# Patient Record
Sex: MALE | Race: BLACK OR AFRICAN AMERICAN | NOT HISPANIC OR LATINO | URBAN - METROPOLITAN AREA
[De-identification: names, ages, dates, MRNs, and addresses within clinical notes are randomized per-mention and may not be internally consistent; named-entity substitution may affect disease eponyms.]

---

## 2021-06-28 ENCOUNTER — OFFICE VISIT (OUTPATIENT)
Dept: URBAN - METROPOLITAN AREA CLINIC 96 | Facility: CLINIC | Age: 56
End: 2021-06-28
Payer: COMMERCIAL

## 2021-06-28 ENCOUNTER — WEB ENCOUNTER (OUTPATIENT)
Dept: URBAN - METROPOLITAN AREA CLINIC 96 | Facility: CLINIC | Age: 56
End: 2021-06-28

## 2021-06-28 VITALS
DIASTOLIC BLOOD PRESSURE: 93 MMHG | WEIGHT: 258 LBS | TEMPERATURE: 96.5 F | HEART RATE: 89 BPM | SYSTOLIC BLOOD PRESSURE: 173 MMHG | BODY MASS INDEX: 38.21 KG/M2 | HEIGHT: 69 IN

## 2021-06-28 DIAGNOSIS — R10.13 DYSPEPSIA: ICD-10-CM

## 2021-06-28 DIAGNOSIS — K92.1 MELENA: ICD-10-CM

## 2021-06-28 DIAGNOSIS — K44.9 HIATAL HERNIA: ICD-10-CM

## 2021-06-28 DIAGNOSIS — R68.81 EARLY SATIETY: ICD-10-CM

## 2021-06-28 DIAGNOSIS — R53.83 FATIGUE, UNSPECIFIED TYPE: ICD-10-CM

## 2021-06-28 DIAGNOSIS — K62.5 RECTAL BLEEDING: ICD-10-CM

## 2021-06-28 DIAGNOSIS — R12 HEARTBURN: ICD-10-CM

## 2021-06-28 PROCEDURE — 99204 OFFICE O/P NEW MOD 45 MIN: CPT | Performed by: INTERNAL MEDICINE

## 2021-06-28 NOTE — HPI-TODAY'S VISIT:
Patient presents self referred. 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 Estevez 9/28/2015, normal to TI. EGD same date with hiatal hernia otherwise normal. Hx of SBO and SBFT as outpatient as ordered by Dr. Herb elkins 9/2/2015.   Patient reports last week had episode of melena. No prior such episode. Witnessed by wife. June 5-14 was having such. No easy bruising or bleeding, no unintentional weight loss. No f/c. No NSAIDs. No hx of PUD. No  hx of hepatitis. Hx of GERD.  No hx of H pylori. No NSAIDs.   No recent labs.   No prior hx of anemia.  No easy bruising or bleeding. Does report fatigue.   No dysphagia. Does report early satiety. Worsening GERD and taking Tums daily which does help.  Occasional rectal bleeding.

## 2021-07-05 LAB
ABSOLUTE NRBC COUNT: 0
HCT: 38.5
HGB: 12.1
MCH: 27.3
MCHC: 31.4
MCV: 86.7
MPV: 12.1
NRBC AUTO: 0
PERFORMING LAB: (no result)
PLATELETS: 201
RBC: 4.44
RDW: 12.9
WBC: 6.5

## 2021-08-19 ENCOUNTER — OFFICE VISIT (OUTPATIENT)
Dept: URBAN - METROPOLITAN AREA SURGERY CENTER 18 | Facility: SURGERY CENTER | Age: 56
End: 2021-08-19
Payer: COMMERCIAL

## 2021-08-19 DIAGNOSIS — K62.5 ANAL BLEEDING: ICD-10-CM

## 2021-08-19 DIAGNOSIS — K31.89 ACQUIRED DEFORMITY OF DUODENUM: ICD-10-CM

## 2021-08-19 DIAGNOSIS — K21.00 ALKALINE REFLUX ESOPHAGITIS: ICD-10-CM

## 2021-08-19 PROCEDURE — 45378 DIAGNOSTIC COLONOSCOPY: CPT | Performed by: INTERNAL MEDICINE

## 2021-08-19 PROCEDURE — 43239 EGD BIOPSY SINGLE/MULTIPLE: CPT | Performed by: INTERNAL MEDICINE

## 2021-08-19 PROCEDURE — G8907 PT DOC NO EVENTS ON DISCHARG: HCPCS | Performed by: INTERNAL MEDICINE

## 2022-04-13 ENCOUNTER — OFFICE VISIT (OUTPATIENT)
Dept: URBAN - METROPOLITAN AREA CLINIC 96 | Facility: CLINIC | Age: 57
End: 2022-04-13

## 2022-04-13 NOTE — HPI-TODAY'S VISIT:
55 yo male previously seen 6/28/2021. 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 Estevez 9/28/2015, normal to TI. EGD same date with hiatal hernia otherwise normal. Hx of SBO and SBFT as outpatient as ordered by Dr. Estevez normal 9/2/2015.   Patient reports last week had episode of melena. No prior such episode. Witnessed by wife. June 5-14 was having such. No easy bruising or bleeding, no unintentional weight loss. No f/c. No NSAIDs. No hx of PUD. No  hx of hepatitis. Hx of GERD.  No hx of H pylori. No NSAIDs. No dysphagia. Does report early satiety.   EGD and colonoscopy done 8/19/2021 with diverticulosis, internal hemorrhoids. Pathology with normal duodenal bx, gastric bx negative for H pylori, lower esophageal bx with reflux inflammation noted.   He reports

## 2022-05-24 ENCOUNTER — OFFICE VISIT (OUTPATIENT)
Dept: URBAN - METROPOLITAN AREA CLINIC 96 | Facility: CLINIC | Age: 57
End: 2022-05-24

## 2022-05-24 ENCOUNTER — OFFICE VISIT (OUTPATIENT)
Dept: URBAN - METROPOLITAN AREA CLINIC 96 | Facility: CLINIC | Age: 57
End: 2022-05-24
Payer: COMMERCIAL

## 2022-05-24 VITALS
HEART RATE: 98 BPM | DIASTOLIC BLOOD PRESSURE: 81 MMHG | TEMPERATURE: 98.6 F | HEIGHT: 69 IN | SYSTOLIC BLOOD PRESSURE: 145 MMHG | WEIGHT: 247 LBS | BODY MASS INDEX: 36.58 KG/M2

## 2022-05-24 DIAGNOSIS — K44.9 HIATAL HERNIA: ICD-10-CM

## 2022-05-24 DIAGNOSIS — R12 HEARTBURN: ICD-10-CM

## 2022-05-24 DIAGNOSIS — R10.13 DYSPEPSIA: ICD-10-CM

## 2022-05-24 DIAGNOSIS — K21.00 GASTROESOPHAGEAL REFLUX DISEASE WITH ESOPHAGITIS WITHOUT HEMORRHAGE: ICD-10-CM

## 2022-05-24 PROCEDURE — 99214 OFFICE O/P EST MOD 30 MIN: CPT | Performed by: INTERNAL MEDICINE

## 2022-05-24 RX ORDER — PANTOPRAZOLE SODIUM 40 MG/1
1 TABLET TABLET, DELAYED RELEASE ORAL ONCE A DAY
Qty: 90 TABLET | Refills: 1 | OUTPATIENT
Start: 2022-05-24

## 2022-05-24 NOTE — HPI-TODAY'S VISIT:
57 yo male previously seen 6/28/2021. 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 Estevez 9/28/2015, normal to TI. EGD same date with hiatal hernia otherwise normal. Hx of SBO and SBFT as outpatient as ordered by Dr. Estevez normal 9/2/2015.   Patient reports last week had episode of melena. No prior such episode. Witnessed by wife. June 5-14 was having such. No easy bruising or bleeding, no unintentional weight loss. No f/c. No NSAIDs. No hx of PUD. No  hx of hepatitis. Hx of GERD.  No hx of H pylori. No NSAIDs. No dysphagia. Does report early satiety.   EGD and colonoscopy done 8/19/2021 with diverticulosis, internal hemorrhoids. Pathology with normal duodenal bx, gastric bx negative for H pylori, lower esophageal bx with reflux inflammation noted.   He reports

## 2022-05-24 NOTE — HPI-TODAY'S VISIT:
57 yo male previously seen 6/28/2021. 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 Estevez 9/28/2015, normal to TI. EGD same date with hiatal hernia otherwise normal. Hx of SBO and SBFT as outpatient as ordered by Dr. Estevez normal 9/2/2015.   Patient reports last week had episode of melena. No prior such episode. Witnessed by wife. June 5-14 was having such. No easy bruising or bleeding, no unintentional weight loss. No f/c. No NSAIDs. No hx of PUD. No  hx of hepatitis. Hx of GERD.  No hx of H pylori. No NSAIDs. No dysphagia. Does report early satiety.   EGD and colonoscopy done 8/19/2021 with diverticulosis, internal hemorrhoids. Pathology with normal duodenal bx, gastric bx negative for H pylori, lower esophageal bx with reflux inflammation noted.   He reports having daily heartburn episodes, worse after eating. No n/v, no dysphagia, no odynophagia. No melena, no rectal bleeding. No unintentional weight loss. Takes Tums nightly which helps minimally.

## 2022-07-12 ENCOUNTER — OFFICE VISIT (OUTPATIENT)
Dept: URBAN - METROPOLITAN AREA TELEHEALTH 2 | Facility: TELEHEALTH | Age: 57
End: 2022-07-12

## 2022-07-12 RX ORDER — PANTOPRAZOLE SODIUM 40 MG/1
1 TABLET TABLET, DELAYED RELEASE ORAL ONCE A DAY
Qty: 90 TABLET | Refills: 1 | Status: ACTIVE | COMMUNITY
Start: 2022-05-24

## 2023-01-06 ENCOUNTER — ERX REFILL RESPONSE (OUTPATIENT)
Dept: URBAN - METROPOLITAN AREA CLINIC 96 | Facility: CLINIC | Age: 58
End: 2023-01-06

## 2023-01-06 RX ORDER — PANTOPRAZOLE SODIUM 40 MG/1
1 TABLET TABLET, DELAYED RELEASE ORAL ONCE A DAY
Qty: 90 TABLET | Refills: 1 | OUTPATIENT

## 2023-01-06 RX ORDER — PANTOPRAZOLE SODIUM 40 MG/1
TAKE 1 TABLET BY MOUTH EVERY DAY TABLET, DELAYED RELEASE ORAL
Qty: 90 TABLET | Refills: 0 | OUTPATIENT

## 2023-01-20 ENCOUNTER — OFFICE VISIT (OUTPATIENT)
Dept: URBAN - METROPOLITAN AREA TELEHEALTH 2 | Facility: TELEHEALTH | Age: 58
End: 2023-01-20
Payer: COMMERCIAL

## 2023-01-20 ENCOUNTER — LAB OUTSIDE AN ENCOUNTER (OUTPATIENT)
Dept: URBAN - METROPOLITAN AREA TELEHEALTH 2 | Facility: TELEHEALTH | Age: 58
End: 2023-01-20

## 2023-01-20 VITALS — HEIGHT: 69 IN

## 2023-01-20 DIAGNOSIS — R12 HEARTBURN: ICD-10-CM

## 2023-01-20 DIAGNOSIS — K44.9 HIATAL HERNIA: ICD-10-CM

## 2023-01-20 DIAGNOSIS — K21.00 GASTROESOPHAGEAL REFLUX DISEASE WITH ESOPHAGITIS WITHOUT HEMORRHAGE: ICD-10-CM

## 2023-01-20 PROBLEM — 266433003: Status: ACTIVE | Noted: 2022-05-24

## 2023-01-20 PROCEDURE — 99213 OFFICE O/P EST LOW 20 MIN: CPT | Performed by: INTERNAL MEDICINE

## 2023-01-20 RX ORDER — PANTOPRAZOLE SODIUM 40 MG/1
TAKE 1 TABLET BY MOUTH EVERY DAY TABLET, DELAYED RELEASE ORAL
Qty: 90 TABLET | Refills: 0 | Status: ACTIVE | COMMUNITY

## 2023-01-20 RX ORDER — PANTOPRAZOLE SODIUM 40 MG/1
1 TABLET TABLET, DELAYED RELEASE ORAL ONCE A DAY
Qty: 90 TABLET | Refills: 1 | OUTPATIENT

## 2023-01-20 NOTE — HPI-TODAY'S VISIT:
Pt states he has been having some gastro issues - when he eats he feels like the food is not digesting - gets a burning sensation - kind of like acid reflux - then has stomach pains-  has been going on for awhile He was given Pantoprazole by his PCP - it helped at first and then things started to get bad again - worse than before pain is in midepigastric area at night when he lays down he gets a gurgling noise coming from the center of his chest Last egd in 8/21 -- reflux esophagitis and small hiatal hernia no nsaid use on a regular basis only takes Colchicine as needed as well

## 2023-05-24 ENCOUNTER — ERX REFILL RESPONSE (OUTPATIENT)
Dept: URBAN - METROPOLITAN AREA CLINIC 96 | Facility: CLINIC | Age: 58
End: 2023-05-24

## 2023-05-24 RX ORDER — PANTOPRAZOLE SODIUM 40 MG/1
TAKE 1 TABLET BY MOUTH EVERY DAY TABLET, DELAYED RELEASE ORAL
Qty: 90 TABLET | Refills: 0 | OUTPATIENT

## 2023-05-26 ENCOUNTER — TELEPHONE ENCOUNTER (OUTPATIENT)
Dept: URBAN - METROPOLITAN AREA CLINIC 96 | Facility: CLINIC | Age: 58
End: 2023-05-26

## 2023-06-01 ENCOUNTER — LAB OUTSIDE AN ENCOUNTER (OUTPATIENT)
Dept: URBAN - METROPOLITAN AREA CLINIC 96 | Facility: CLINIC | Age: 58
End: 2023-06-01

## 2023-07-11 ENCOUNTER — OFFICE VISIT (OUTPATIENT)
Dept: URBAN - METROPOLITAN AREA SURGERY CENTER 15 | Facility: SURGERY CENTER | Age: 58
End: 2023-07-11

## 2023-07-28 ENCOUNTER — OFFICE VISIT (OUTPATIENT)
Dept: URBAN - METROPOLITAN AREA SURGERY CENTER 18 | Facility: SURGERY CENTER | Age: 58
End: 2023-07-28

## 2023-08-31 ENCOUNTER — OFFICE VISIT (OUTPATIENT)
Dept: URBAN - METROPOLITAN AREA SURGERY CENTER 18 | Facility: SURGERY CENTER | Age: 58
End: 2023-08-31
Payer: COMMERCIAL

## 2023-08-31 DIAGNOSIS — K22.89 OTHER SPECIFIED DISEASE OF ESOPHAGUS: ICD-10-CM

## 2023-08-31 DIAGNOSIS — K29.60 ADENOPAPILLOMATOSIS GASTRICA: ICD-10-CM

## 2023-08-31 PROCEDURE — G8907 PT DOC NO EVENTS ON DISCHARG: HCPCS | Performed by: INTERNAL MEDICINE

## 2023-08-31 PROCEDURE — 43239 EGD BIOPSY SINGLE/MULTIPLE: CPT | Performed by: INTERNAL MEDICINE

## 2024-02-05 ENCOUNTER — OV EP (OUTPATIENT)
Dept: URBAN - METROPOLITAN AREA CLINIC 96 | Facility: CLINIC | Age: 59
End: 2024-02-05
Payer: COMMERCIAL

## 2024-02-05 VITALS
TEMPERATURE: 97.2 F | HEIGHT: 69 IN | BODY MASS INDEX: 38.51 KG/M2 | WEIGHT: 260 LBS | HEART RATE: 81 BPM | DIASTOLIC BLOOD PRESSURE: 92 MMHG | SYSTOLIC BLOOD PRESSURE: 139 MMHG

## 2024-02-05 DIAGNOSIS — K92.1 MELENA: ICD-10-CM

## 2024-02-05 DIAGNOSIS — K21.00 GASTROESOPHAGEAL REFLUX DISEASE WITH ESOPHAGITIS WITHOUT HEMORRHAGE: ICD-10-CM

## 2024-02-05 DIAGNOSIS — R10.13 DYSPEPSIA: ICD-10-CM

## 2024-02-05 DIAGNOSIS — K44.9 HIATAL HERNIA: ICD-10-CM

## 2024-02-05 PROCEDURE — 99214 OFFICE O/P EST MOD 30 MIN: CPT | Performed by: INTERNAL MEDICINE

## 2024-02-05 RX ORDER — ATORVASTATIN CALCIUM 40 MG/1
TABLET ORAL
Qty: 90 TABLET | Status: ACTIVE | COMMUNITY

## 2024-02-05 RX ORDER — PANTOPRAZOLE SODIUM 40 MG/1
TAKE 1 TABLET BY MOUTH EVERY DAY TABLET, DELAYED RELEASE ORAL
Qty: 90 TABLET | Refills: 0 | Status: ACTIVE | COMMUNITY

## 2024-02-05 RX ORDER — AMLODIPINE AND VALSARTAN 5; 160 MG/1; MG/1
TABLET, FILM COATED ORAL
Qty: 90 TABLET | Status: ACTIVE | COMMUNITY

## 2024-02-05 RX ORDER — PANTOPRAZOLE SODIUM 40 MG/1
1 TABLET TABLET, DELAYED RELEASE ORAL ONCE A DAY
Qty: 90 TABLET | Refills: 4

## 2024-02-05 RX ORDER — CHLORPHENIRAMINE MALEATE 4 MG/1
TABLET ORAL
Qty: 100 TABLET | Status: ACTIVE | COMMUNITY

## 2024-02-05 RX ORDER — HYDROCODONE BITARTRATE AND HOMATROPINE METHYLBROMIDE 5; 1.5 MG/5ML; MG/5ML
TAKE 5 ML BY MOUTH THREE TIMES DAILY AS NEEDED FOR COUGH SOLUTION ORAL
Qty: 200 MILLILITER | Refills: 0 | Status: ACTIVE | COMMUNITY

## 2024-02-05 RX ORDER — AZELASTINE HYDROCHLORIDE 137 UG/1
USE 2 SPRAYS IN EACH NOSTRIL TWICE DAILY SPRAY, METERED NASAL
Qty: 30 MILLILITER | Refills: 0 | Status: ACTIVE | COMMUNITY

## 2024-02-05 NOTE — HPI-TODAY'S VISIT:
59 yo male previously seen 5/24/2022 by me. (Brother of ARTEMIO Schroeder)  As noted prior, screening colonoscopy with Dr. Estevez 9/28/2015, normal to TI. EGD same date with hiatal hernia otherwise normal. Hx of SBO and SBFT as outpatient as ordered by Dr. Estevez normal 9/2/2015  EGD and colonoscopy done 8/19/2021 with diverticulosis, internal hemorrhoids. Pathology with normal duodenal bx, gastric bx negative for H pylori, lower esophageal bx with reflux inflammation noted.

## 2024-02-05 NOTE — HPI-TODAY'S VISIT:
More recent clinic visit with FLY Bennett 1/20/2023 for heartburn.  Pantoprazole 40 mg once daily was prescribed.  EGD was performed 8/31/2023 and notable for small hiatal hernia, gastric erythema, grade A esophagitis noted.  Irregular Z-line at 38 cm.  Pathology with gastric biopsies negative for H. pylori.  GE junction biopsies demonstrated mild chronic inflammation with no evidence of intestinal metaplasia.  He reports on antibiotics for bronchitis. No dysphgagia, no odynophagia, no n/v. No CP or SOB. Taking pantoprazole daily. Report rare discomfort with eating. Eats supper at 6 pm, bedtime at 10 pm. Wife on phone reports when he goes to sleep he will have "bubbling" of fluid. No snoring. Reports weight gain of 20# over the past year. Not exercising regularly. No CP or SOB.   Has PEYMAN and admits to not wearing his CPAP given "claustrophobic" with such.